# Patient Record
Sex: FEMALE | Race: WHITE | NOT HISPANIC OR LATINO | URBAN - NONMETROPOLITAN AREA
[De-identification: names, ages, dates, MRNs, and addresses within clinical notes are randomized per-mention and may not be internally consistent; named-entity substitution may affect disease eponyms.]

---

## 2017-05-17 PROBLEM — H43.813: Noted: 2019-04-13

## 2017-05-17 PROBLEM — H52.4: Noted: 2019-04-13

## 2017-05-17 PROBLEM — H40.001: Noted: 2019-04-13

## 2017-05-17 PROBLEM — H52.223: Noted: 2019-04-13

## 2017-05-17 PROBLEM — H52.01: Noted: 2019-04-13

## 2017-05-17 PROBLEM — H35.373: Noted: 2017-05-17

## 2017-05-17 PROBLEM — H25.093: Noted: 2019-04-13

## 2017-11-01 NOTE — PATIENT DISCUSSION
OCULAR ROSACEA, OU: PRESCRIBE WARM COMPRESSES AND EYELID SCRUBS QD-BID, ARTIFICIAL TEARS BID-QID AND THE DAILY INTAKE OF OMEGA-3 FATTY ACIDS. CONSIDER TOPICAL STEROID, ORAL TETRACYCLINE AND/OR LIPIFLOW FOR EXACERBATIONS. RETURN FOR FOLLOW-UP AS SCHEDULED.

## 2018-07-24 NOTE — PATIENT DISCUSSION
PHOTOGRAPHS: I have reviewed the external ocular photographs of this patient which show the following: lesion on the left lower eyelid.

## 2018-11-28 NOTE — PATIENT DISCUSSION
New Prescription: Restasis MultiDose (cyclosporine): drops: 0.05% 1 drop twice a day as directed into both eyes 11-

## 2018-11-28 NOTE — PATIENT DISCUSSION
OCULAR ROSACEA, OU: CONTINUE WARM COMPRESSES AND EYELID SCRUBS QD-BID, ARTIFICIAL TEARS BID-QID AND THE DAILY INTAKE OF OMEGA-3 FATTY ACIDS.   CONTINUE DOXY 50 QDAY

## 2018-11-28 NOTE — PATIENT DISCUSSION
Ocular Rosacea Counseling: I have explained the diagnosis of Ocular Rosacea and its pathophysiology. I have explained to the patient that if left untreated, ocular rosacea can cause corneal damage from tear film insufficiency or eyelid damage from inflammation, both of which could lead to vision loss. Encouraged patient to avoid exacerbating environmental factors. I instructed the patient on using warm compresses and eyelid scrubs. I also instructed the patient on using artificial tears two to four times per day. Successful management is dependent on patient compliance with the treatment regimen. Return for follow-up as scheduled or sooner if symptoms worsen.

## 2019-02-26 NOTE — PATIENT DISCUSSION
TRICHIASIS, OD: LASHES EPILATED WITHOUT DIFFICULTY FROM RLL. IF RECURRENT WILL CONSIDER REFERRAL FOR SURGICAL TREATMENT.

## 2019-02-26 NOTE — PATIENT DISCUSSION
Continue: Restasis MultiDose (cyclosporine): drops: 0.05% 1 drop twice a day as directed into both eyes 11-

## 2019-02-26 NOTE — PATIENT DISCUSSION
K SICCA OU: CONTINUE PRESERVATIVE FREE ARTIFICIAL TEARS 4-6X A DAY, OU AND THE DAILY INTAKE OF OMEGA-3 DHA/EPA FATTY ACIDS TO HELP RELIEVE SYMPTOMS. ADD NIGHTLY LUBRICATING OINTMENT OR GEL. CONTINUE RESTASIS BID. CONSIDER PUNCTAL PLUGS AND/OR LIPIFLOW TREATMENT NEXT VISIT IF NOT RESPONSIVE OR IF SYMPTOMS PERSIST. ADD LL PUNTAL PLUGS TODAY. RETURN FOR FOLLOW-UP AS SCHEDULED OR SOONER IF SYMPTOMS WORSEN.

## 2019-04-12 ENCOUNTER — IMPORTED ENCOUNTER (OUTPATIENT)
Dept: URBAN - NONMETROPOLITAN AREA CLINIC 1 | Facility: CLINIC | Age: 69
End: 2019-04-12

## 2019-04-12 PROCEDURE — 92015 DETERMINE REFRACTIVE STATE: CPT

## 2019-04-12 PROCEDURE — 99213 OFFICE O/P EST LOW 20 MIN: CPT

## 2019-04-12 NOTE — PATIENT DISCUSSION
ERM (Macular Pucker or Epiretinal Membrane) OU:-  Educated patient on condition/discussed diagnosis in detail with the patient. -  patient will continue to see Lina q6 mo -  Discussed/Continue use of AG daily call or come in ASAP if changes in vision are noted from today.-  RTC 1 year or prnBorderline Glaucoma OU-  discussed findings w/patient-  mild c/d asymmetry low risk-  continue to monitor yearly w/OCT ON and PachPVD OU-  discussed findings w/patient-  Retina flat 360 with no breaks tears or heme. -  S&S of RD/RT reviewed with pt. -  Stressed that pt should contact our office right away with any changes or increase in symptoms.-  RTC 1 year or prnCataracts OU-  discussed findings w/patient-  no treatment indicated-  UV protection recommended-  RTC 1 year or prnCompound Hyperopic Astigmatism OD/Simple Astigmatism OS w/Presbyopia-  discussed findings w/patient-  new spectacle Rx issued-  continue to monitor yearly or prn; 's Notes: MR 4/12/2019DFE deferredOCT ONPach

## 2019-11-19 NOTE — PATIENT DISCUSSION
K SICCA OU: s/p LL PLUGS OU, CONTINUE RESTASIS BID OU. PRESERVATIVE FREE ARTIFICIAL TEARS 4-6X A DAY, OU AND THE DAILY INTAKE OF OMEGA-3 DHA/EPA FATTY ACIDS TO HELP RELIEVE SYMPTOMS. ADD NIGHTLY LUBRICATING OINTMENT OR GEL.  REFER TO [facility]

## 2019-12-04 NOTE — PATIENT DISCUSSION
New Prescription: [place] (cyclosporine): dropperette: 0.09% 1 drop twice a day into both eyes 12-

## 2020-01-29 NOTE — PATIENT DISCUSSION
Continue: Refresh Celluvisc (carboxymethylcellulose sodium): dropperette,gel: 1% every night into both eyes

## 2020-01-29 NOTE — PATIENT DISCUSSION
Lipiflow: your doctor recommends a procedure called Lipiflow which uses gentle heat and pressure to completely evacuate the oil from your eyelid oil glands. This will improve the quality of your natural tears and preserve the health of your oil glands.

## 2020-01-29 NOTE — PATIENT DISCUSSION
Continue: Retaine MGD (PF) (light mineral oil-min oil (pf)): dropperette: 0.5-0.5% once a day into both eyes

## 2020-08-19 NOTE — PATIENT DISCUSSION
Continue: doxycycline hyclate (doxycycline hyclate): tablet: 50 mg 1 tablet once a day as directed by mouth

## 2020-08-19 NOTE — PATIENT DISCUSSION
Stopped Today: Refresh Celluvisc (carboxymethylcellulose sodium): dropperette,gel: 1% every night into both eyes

## 2020-11-12 ENCOUNTER — IMPORTED ENCOUNTER (OUTPATIENT)
Dept: URBAN - NONMETROPOLITAN AREA CLINIC 1 | Facility: CLINIC | Age: 70
End: 2020-11-12

## 2020-11-12 PROCEDURE — 92015 DETERMINE REFRACTIVE STATE: CPT

## 2020-11-12 PROCEDURE — 92014 COMPRE OPH EXAM EST PT 1/>: CPT

## 2020-11-12 NOTE — PATIENT DISCUSSION
ERM OU:-  discussed findings w/patient-  Educated patient on condition/discussed diagnosis in detail with the patient. -  patient will continue to see Lina q6 mo -  Discussed/Continue use of AG daily call or come in ASAP if changes in vision are noted from today.-  RTC 1 year or prnBorderline Glaucoma OU-  discussed findings w/patient-  mild c/d asymmetry low risk-  continue to monitor yearly w/OCT ON and PachPVD OU-  discussed findings w/patient-  Retina flat 360 with no breaks tears or heme. -  S&S of RD/RT reviewed with pt. -  Stressed that pt should contact our office right away with any changes or increase in symptoms.-  RTC 1 year or prnCataracts OU-  discussed findings w/patient-  no treatment indicated-  UV protection recommended-  RTC 1 year or prnCompound Hyperopic Astigmatism OD/Simple Astigmatism OS w/Presbyopia-  discussed findings w/patient-  new spectacle Rx issued-  continue to monitor yearly or prn; 's Notes: MR 11/12/2020DFE 11/12/2020OCT ONPach

## 2020-11-17 NOTE — PATIENT DISCUSSION
Ocular Rosacea Counseling: I have explained the diagnosis of Ocular Rosacea and its pathophysiology. I have explained to the patient that if left untreated, ocular rosacea can cause corneal damage from tear film insufficiency or eyelid damage from inflammation, both of which could lead to vision loss. Encouraged patient to avoid exacerbating environmental factors. I instructed the patient on using warm compresses and eyelid scrubs. I also instructed the patient on using artificial tears two to four times per day. Successful management is dependent on patient compliance with the treatment regimen.  Return for follow-up as scheduled or sooner if symptoms worsen

## 2020-11-17 NOTE — PATIENT DISCUSSION
New Prescription: timolol maleate (timolol maleate): drops: 0.5% 1 drop as directed into both eyes 11-

## 2020-11-17 NOTE — PATIENT DISCUSSION
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 SUGGESTED 400MG-500MG MAGNESIUM SUPPLEMENTS AT BEDTIME. USE TIMOLOL QDAY OU PRN FOR SYMPTOMS. DO NOT USE MORE THE BID OU. CALL BACK IMMEDIATELY FOR ANY WORSENING SYMPTOMS. RETURN FOR FOLLOW-UP AS SCHEDULED.

## 2022-01-10 ENCOUNTER — IMPORTED ENCOUNTER (OUTPATIENT)
Dept: URBAN - NONMETROPOLITAN AREA CLINIC 1 | Facility: CLINIC | Age: 72
End: 2022-01-10

## 2022-01-10 PROCEDURE — 92133 CPTRZD OPH DX IMG PST SGM ON: CPT

## 2022-01-10 PROCEDURE — 92014 COMPRE OPH EXAM EST PT 1/>: CPT

## 2022-01-10 PROCEDURE — 76514 ECHO EXAM OF EYE THICKNESS: CPT

## 2022-01-10 PROCEDURE — 92015 DETERMINE REFRACTIVE STATE: CPT

## 2022-01-10 NOTE — PATIENT DISCUSSION
ERM OU:-  discussed findings w/patient-  Educated patient on condition/discussed diagnosis in detail with the patient. -  patient will continue to see Lina q6 mo -  Discussed/Continue use of AG daily call or come in ASAP if changes in vision are noted from today.-  RTC 1 year or prnBorderline Glaucoma OU-  discussed findings w/patient-  mild c/d asymmetry low risk-  IOPs 14 13-  OCT ON done 1/10/2022    OD: 103um 7/10 SS normal RNFL all quadrants    OS: 101um 8/10 SS normal RNFL all quadrants-  Pach done by NL 1/10/2022    OD: 505    OS: 505-  no treatment indicated at this time-  continue to monitor yearly w/OCT ON or prnPVD OU-  discussed findings w/patient-  Retina flat 360 with no breaks tears or heme. -  S&S of RD/RT reviewed with pt. -  Stressed that pt should contact our office right away with any changes or increase in symptoms.-  RTC 1 year or prnCataracts OU-  discussed findings w/patient-  worsening noted at this time-  patient defers cat eval -  she has had discussion w/Lina that cat surgery might make her vision better than surgery that he could do for her-  continue to monitor yearly or prnCompound Hyperopic Astigmatism OU w/Presbyopia-  discussed findings w/patient-  new spectacle Rx issued-  continue to monitor yearly or prn; 's Notes: MR 1/10/2022DFE 1/10/2022OCT ON 1/10/2022Pach (NL) 505 505

## 2022-04-09 ASSESSMENT — TONOMETRY
OS_IOP_MMHG: 13
OS_IOP_MMHG: 14
OD_IOP_MMHG: 14
OD_IOP_MMHG: 13
OS_IOP_MMHG: 15
OD_IOP_MMHG: 15

## 2022-04-09 ASSESSMENT — VISUAL ACUITY
OS_SC: 20/40-1
OS_SC: 20/50-1
OS_SC: 20/40-2
OU_CC: 20/20
OU_CC: 20/30
OS_GLARE: 20/60+2
OD_SC: 20/30+1
OU_CC: 20/20
OU_SC: 20/20
OD_GLARE: 20/40
OD_SC: 20/30
OD_SC: 20/20-2

## 2022-11-30 ENCOUNTER — NEW PATIENT (OUTPATIENT)
Dept: URBAN - METROPOLITAN AREA CLINIC 2 | Facility: CLINIC | Age: 72
End: 2022-11-30

## 2022-11-30 DIAGNOSIS — H25.13: ICD-10-CM

## 2022-11-30 DIAGNOSIS — H35.373: ICD-10-CM

## 2022-11-30 DIAGNOSIS — H40.013: ICD-10-CM

## 2022-11-30 PROCEDURE — 92015 DETERMINE REFRACTIVE STATE: CPT

## 2022-11-30 PROCEDURE — 99204 OFFICE O/P NEW MOD 45 MIN: CPT

## 2022-11-30 PROCEDURE — 92134 CPTRZ OPH DX IMG PST SGM RTA: CPT

## 2022-11-30 ASSESSMENT — VISUAL ACUITY
OS_CC: J1
OS_CC: 20/50
OS_BAT: 20/400
OD_CC: 20/30
OS_SC: 20/100
OD_BAT: 20/100
OD_CC: J1
OD_SC: 20/60

## 2022-11-30 ASSESSMENT — TONOMETRY
OS_IOP_MMHG: 14
OD_IOP_MMHG: 14

## 2022-11-30 NOTE — PATIENT DISCUSSION
Visually significant secondary to glare, discussed the risks, benefits, alternatives, and limitations of cataract surgery. The patient stated a full understanding and a desire to proceed with the procedure. The patient will need to return for pre-op appointment with cataract measurements and to have any additional questions answered, and start pre-operative eye drops as directed. Okay to schedule pre-op H&P OS then OD.

## 2022-11-30 NOTE — PATIENT DISCUSSION
C/D:0.4/0.1. IOP stable today at 14 OU. Patient previously seen by Dr. Alondra Hanson. Previous PACHY 505 OU and previous baseline OCT ON stable and WNL OU. Patient is considered low risk. Condition was discussed with patient and patient understands. Will continue to monitor patient for any progression in condition. Patient was advised to call us with any problems, questions, or concerns.

## 2022-11-30 NOTE — PATIENT DISCUSSION
Diagnosis discussed with patient in detail today. Baseline MAC OCT performed today. Advised the patient to follow-up with Dr. Colin Mccracken as scheduled.

## 2023-01-06 ENCOUNTER — PRE-OP/H&P (OUTPATIENT)
Dept: URBAN - METROPOLITAN AREA CLINIC 2 | Facility: CLINIC | Age: 73
End: 2023-01-06

## 2023-01-06 VITALS
SYSTOLIC BLOOD PRESSURE: 101 MMHG | WEIGHT: 152 LBS | BODY MASS INDEX: 23.04 KG/M2 | DIASTOLIC BLOOD PRESSURE: 62 MMHG | HEIGHT: 68 IN

## 2023-01-06 DIAGNOSIS — H25.13: ICD-10-CM

## 2023-01-06 DIAGNOSIS — Z01.818: ICD-10-CM

## 2023-01-06 PROCEDURE — 99499 UNLISTED E&M SERVICE: CPT

## 2023-01-06 PROCEDURE — 92136 OPHTHALMIC BIOMETRY: CPT

## 2023-01-06 ASSESSMENT — VISUAL ACUITY
OS_SC: 20/100
OS_BAT: 20/400

## 2023-01-06 ASSESSMENT — TONOMETRY
OS_IOP_MMHG: 13
OD_IOP_MMHG: 13

## 2023-01-06 NOTE — PATIENT DISCUSSION
Diagnosis discussed with patient in detail today. Baseline MAC OCT performed today. Advised the patient to follow-up with Dr. Dexter Chun as scheduled.

## 2023-01-06 NOTE — PATIENT DISCUSSION
Standard/Traditional: The need for bifocals with the Standard/Traditional surgery was discussed with the patient. The patient elects to proceed with CE Standard/Traditional OU, OS first, then OD.

## 2023-01-17 ENCOUNTER — SURGERY/PROCEDURE (OUTPATIENT)
Dept: URBAN - METROPOLITAN AREA SURGERY 1 | Facility: SURGERY | Age: 73
End: 2023-01-17

## 2023-01-17 DIAGNOSIS — H25.12: ICD-10-CM

## 2023-01-17 PROCEDURE — 66984 XCAPSL CTRC RMVL W/O ECP: CPT

## 2023-01-17 NOTE — PATIENT DISCUSSION
Standard/Traditional: The need for bifocals with the Standard/Traditional surgery was discussed with the patient. The patient elects to proceed with CE Standard/Traditional OU, OS first, then OD. [Time Spent: ___ minutes] : I have spent [unfilled] minutes of time on the encounter.

## 2023-01-17 NOTE — PATIENT DISCUSSION
Diagnosis discussed with patient in detail today. Baseline MAC OCT performed today. Advised the patient to follow-up with Dr. Yfn Foley as scheduled.

## 2023-01-17 NOTE — PATIENT DISCUSSION
Recommend [id number] + 20.0 diopters OS. Results   Trichomonas Vaginalis KINDRA   17 Mar 2017 12:01 AM  -   TRICHOMONAS SOURCE: CERVIX  -   TRICHOMONAS VAGINALIS KINDRA: NEGATIVE  Reference Range: NEGATIVE.  CHLAMYDIA / GC BY NUCLEIC ACID AMPLIFICATION   17 Mar 2017 12:01 AM  -   CHLAMYDIA TRACHOMATIS BY NUCLEIC ACID AMPLIFICATION: NEGATIVE  Reference Range: NEGATIVE  -   NEISSERIA GONORRHOEAE BY NUCLEIC ACID AMPLIFICATION: NEGATIVE  Reference Range: NEGATIVE  -   SOURCE: ENDOCERVIX.  Discussed   The tests for gonorrhea, chlamydia, and trichomonas were all negative.

## 2023-01-18 ENCOUNTER — POST-OP (OUTPATIENT)
Dept: URBAN - METROPOLITAN AREA CLINIC 2 | Facility: CLINIC | Age: 73
End: 2023-01-18

## 2023-01-18 DIAGNOSIS — Z96.1: ICD-10-CM

## 2023-01-18 PROCEDURE — 99024 POSTOP FOLLOW-UP VISIT: CPT

## 2023-01-18 ASSESSMENT — VISUAL ACUITY
OS_PH: 20/50
OS_SC: 20/50

## 2023-01-18 ASSESSMENT — TONOMETRY: OS_IOP_MMHG: 13

## 2023-01-18 NOTE — PATIENT DISCUSSION
Continue with post-operative drops (Prednisolone Acetate, Ocuflox, and Ketorolac) until completed. All post-op drops instilled in office today by PVM. Discussed warning symptoms of RD/infection with pt who understands to call/seek urgent evaluation if they occur. Patient to wear protective shield over operative eye when sleeping x 1 week. Recheck in 1 week.

## 2023-01-25 ENCOUNTER — POST-OP (OUTPATIENT)
Dept: URBAN - METROPOLITAN AREA CLINIC 2 | Facility: CLINIC | Age: 73
End: 2023-01-25

## 2023-01-25 VITALS
BODY MASS INDEX: 23.04 KG/M2 | DIASTOLIC BLOOD PRESSURE: 64 MMHG | HEART RATE: 64 BPM | SYSTOLIC BLOOD PRESSURE: 113 MMHG | WEIGHT: 152 LBS | HEIGHT: 68 IN

## 2023-01-25 DIAGNOSIS — H25.11: ICD-10-CM

## 2023-01-25 DIAGNOSIS — Z96.1: ICD-10-CM

## 2023-01-25 PROCEDURE — 92025 CPTRIZED CORNEAL TOPOGRAPHY: CPT

## 2023-01-25 PROCEDURE — 99024 POSTOP FOLLOW-UP VISIT: CPT

## 2023-01-25 PROCEDURE — 92136 OPHTHALMIC BIOMETRY: CPT

## 2023-01-25 ASSESSMENT — TONOMETRY
OS_IOP_MMHG: 14
OD_IOP_MMHG: 14

## 2023-01-25 ASSESSMENT — VISUAL ACUITY
OD_SC: 20/60
OD_BAT: 20/100
OS_SC: 20/40+2

## 2023-01-31 ENCOUNTER — SURGERY/PROCEDURE (OUTPATIENT)
Dept: URBAN - METROPOLITAN AREA SURGERY 1 | Facility: SURGERY | Age: 73
End: 2023-01-31

## 2023-01-31 DIAGNOSIS — H25.11: ICD-10-CM

## 2023-01-31 PROCEDURE — 66984 XCAPSL CTRC RMVL W/O ECP: CPT

## 2023-10-11 ENCOUNTER — EMERGENCY VISIT (OUTPATIENT)
Dept: URBAN - METROPOLITAN AREA CLINIC 2 | Facility: CLINIC | Age: 73
End: 2023-10-11

## 2023-10-11 DIAGNOSIS — H40.013: ICD-10-CM

## 2023-10-11 DIAGNOSIS — H35.373: ICD-10-CM

## 2023-10-11 PROCEDURE — 92134 CPTRZ OPH DX IMG PST SGM RTA: CPT

## 2023-10-11 PROCEDURE — 99213 OFFICE O/P EST LOW 20 MIN: CPT

## 2023-10-11 ASSESSMENT — TONOMETRY
OS_IOP_MMHG: 13
OD_IOP_MMHG: 13

## 2023-10-11 ASSESSMENT — VISUAL ACUITY
OS_CC: 20/30
OD_CC: J1
OS_BAT: 20/60
OD_BAT: 20/40
OD_CC: 20/25
OS_CC: J1

## 2024-03-01 ENCOUNTER — COMPREHENSIVE EXAM (OUTPATIENT)
Dept: URBAN - METROPOLITAN AREA CLINIC 2 | Facility: CLINIC | Age: 74
End: 2024-03-01

## 2024-03-01 DIAGNOSIS — H26.493: ICD-10-CM

## 2024-03-01 DIAGNOSIS — H40.013: ICD-10-CM

## 2024-03-01 DIAGNOSIS — H35.371: ICD-10-CM

## 2024-03-01 PROCEDURE — 92134 CPTRZ OPH DX IMG PST SGM RTA: CPT

## 2024-03-01 PROCEDURE — 99214 OFFICE O/P EST MOD 30 MIN: CPT

## 2024-03-01 ASSESSMENT — TONOMETRY
OS_IOP_MMHG: 12
OD_IOP_MMHG: 12

## 2024-03-01 ASSESSMENT — VISUAL ACUITY
OS_CC: J2
OD_CC: 20/25
OS_CC: 20/25
OD_CC: J1

## 2024-08-29 NOTE — PATIENT DISCUSSION
The patient had a lesion on the left lower eyelid. After informed consent the lesion was anesthetized with local anesthetic, 1% lidocane with epinephrine 1:100,000 units. Sterile technique was used to remove the lesion with George scissors. Antibiotic ointment was used to treat the area where lesion was removed. Lesion was sent to pathology for analysis. The patient was given written post operative wound care instructions and a prescription for antibiotic ointment. The patient was asked to call  within 10 days if they had not been otherwise called by our office with the result of the biopsy. [place]

## 2024-11-01 ENCOUNTER — FOLLOW UP (OUTPATIENT)
Dept: URBAN - METROPOLITAN AREA CLINIC 2 | Facility: CLINIC | Age: 74
End: 2024-11-01

## 2024-11-01 DIAGNOSIS — H40.013: ICD-10-CM

## 2024-11-01 DIAGNOSIS — H35.371: ICD-10-CM

## 2024-11-01 DIAGNOSIS — H26.493: ICD-10-CM

## 2024-11-01 PROCEDURE — 92014 COMPRE OPH EXAM EST PT 1/>: CPT

## 2024-11-01 PROCEDURE — 92083 EXTENDED VISUAL FIELD XM: CPT
